# Patient Record
Sex: FEMALE | Race: WHITE | NOT HISPANIC OR LATINO
[De-identification: names, ages, dates, MRNs, and addresses within clinical notes are randomized per-mention and may not be internally consistent; named-entity substitution may affect disease eponyms.]

---

## 2019-09-05 PROBLEM — Z00.00 ENCOUNTER FOR PREVENTIVE HEALTH EXAMINATION: Status: ACTIVE | Noted: 2019-09-05

## 2019-09-10 ENCOUNTER — APPOINTMENT (OUTPATIENT)
Dept: OTOLARYNGOLOGY | Facility: CLINIC | Age: 69
End: 2019-09-10
Payer: MEDICARE

## 2019-09-10 VITALS
HEIGHT: 64 IN | SYSTOLIC BLOOD PRESSURE: 158 MMHG | DIASTOLIC BLOOD PRESSURE: 77 MMHG | BODY MASS INDEX: 25.95 KG/M2 | OXYGEN SATURATION: 97 % | HEART RATE: 63 BPM | TEMPERATURE: 98 F | WEIGHT: 152 LBS

## 2019-09-10 DIAGNOSIS — Z78.9 OTHER SPECIFIED HEALTH STATUS: ICD-10-CM

## 2019-09-10 DIAGNOSIS — H91.90 UNSPECIFIED HEARING LOSS, UNSPECIFIED EAR: ICD-10-CM

## 2019-09-10 PROCEDURE — 99203 OFFICE O/P NEW LOW 30 MIN: CPT

## 2019-09-10 PROCEDURE — 92557 COMPREHENSIVE HEARING TEST: CPT

## 2019-09-10 PROCEDURE — 92550 TYMPANOMETRY & REFLEX THRESH: CPT

## 2019-09-10 NOTE — HISTORY OF PRESENT ILLNESS
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  she feels her hl is moderate and cannot hear her friends talk. referred by dr mayers.

## 2019-09-16 ENCOUNTER — APPOINTMENT (OUTPATIENT)
Dept: OTOLARYNGOLOGY | Facility: CLINIC | Age: 69
End: 2019-09-16
Payer: MEDICARE

## 2019-09-16 ENCOUNTER — APPOINTMENT (OUTPATIENT)
Dept: PHARMACY | Facility: CLINIC | Age: 69
End: 2019-09-16
Payer: SELF-PAY

## 2019-09-16 ENCOUNTER — APPOINTMENT (OUTPATIENT)
Dept: PHARMACY | Facility: CLINIC | Age: 69
End: 2019-09-16
Payer: MEDICARE

## 2019-09-16 VITALS
TEMPERATURE: 98 F | OXYGEN SATURATION: 98 % | DIASTOLIC BLOOD PRESSURE: 80 MMHG | HEART RATE: 78 BPM | SYSTOLIC BLOOD PRESSURE: 184 MMHG

## 2019-09-16 DIAGNOSIS — H90.41 SENSORINEURAL HEARING LOSS, UNILATERAL, RIGHT EAR, WITH UNRESTRICTED HEARING ON THE CONTRALATERAL SIDE: ICD-10-CM

## 2019-09-16 PROCEDURE — 99213 OFFICE O/P EST LOW 20 MIN: CPT

## 2019-09-16 PROCEDURE — V5299A: CUSTOM | Mod: NC

## 2019-09-16 RX ORDER — FLUTICASONE PROPIONATE 50 UG/1
50 SPRAY, METERED NASAL DAILY
Qty: 1 | Refills: 3 | Status: ACTIVE | COMMUNITY
Start: 2019-09-16 | End: 1900-01-01

## 2019-09-16 NOTE — DATA REVIEWED
[de-identified] : pt had to wait and come back due to malfunction at r ultimately reviewed images with pt dr smith and her daughter - mild wm change, reassured, and max retention cyst, else normal

## 2019-09-16 NOTE — HISTORY OF PRESENT ILLNESS
[de-identified] : followup 67 yo woman here with her daughter, with asymm snhl - she had mri and is here to review and discuss her options. She feels her hearing has not changed since last week. No h/o noise exp. no fh nonsmoker.

## 2019-10-28 ENCOUNTER — APPOINTMENT (OUTPATIENT)
Dept: PHARMACY | Facility: CLINIC | Age: 69
End: 2019-10-28
Payer: SELF-PAY

## 2019-10-28 PROCEDURE — V5299A: CUSTOM | Mod: NC

## 2019-11-12 ENCOUNTER — APPOINTMENT (OUTPATIENT)
Dept: PHARMACY | Facility: CLINIC | Age: 69
End: 2019-11-12
Payer: SELF-PAY

## 2019-11-12 PROCEDURE — V5299A: CUSTOM | Mod: NC

## 2019-11-14 ENCOUNTER — APPOINTMENT (OUTPATIENT)
Dept: PHARMACY | Facility: CLINIC | Age: 69
End: 2019-11-14
Payer: SELF-PAY

## 2019-11-14 PROCEDURE — V5257E: CUSTOM

## 2019-11-14 PROCEDURE — V5010 ASSESSMENT FOR HEARING AID: CPT

## 2019-12-23 ENCOUNTER — APPOINTMENT (OUTPATIENT)
Dept: PHARMACY | Facility: CLINIC | Age: 69
End: 2019-12-23
Payer: MEDICARE

## 2019-12-23 PROCEDURE — V5299A: CUSTOM | Mod: NC

## 2021-09-01 ENCOUNTER — APPOINTMENT (OUTPATIENT)
Dept: PHARMACY | Facility: CLINIC | Age: 71
End: 2021-09-01
Payer: SELF-PAY

## 2021-09-01 PROCEDURE — V5299A: CUSTOM | Mod: NC

## 2022-03-29 ENCOUNTER — APPOINTMENT (OUTPATIENT)
Dept: PHARMACY | Facility: CLINIC | Age: 72
End: 2022-03-29
Payer: SELF-PAY

## 2022-03-29 PROCEDURE — V5299A: CUSTOM | Mod: NC

## 2022-04-25 ENCOUNTER — APPOINTMENT (OUTPATIENT)
Dept: PHARMACY | Facility: CLINIC | Age: 72
End: 2022-04-25
Payer: SELF-PAY

## 2022-04-25 PROCEDURE — V5299A: CUSTOM | Mod: NC

## 2022-05-11 ENCOUNTER — APPOINTMENT (OUTPATIENT)
Dept: PHARMACY | Facility: CLINIC | Age: 72
End: 2022-05-11
Payer: SELF-PAY

## 2022-05-11 PROCEDURE — V5299A: CUSTOM | Mod: NC

## 2022-07-06 ENCOUNTER — APPOINTMENT (OUTPATIENT)
Dept: PHARMACY | Facility: CLINIC | Age: 72
End: 2022-07-06

## 2022-07-06 PROCEDURE — V5299A: CUSTOM | Mod: NC

## 2022-07-26 ENCOUNTER — APPOINTMENT (OUTPATIENT)
Dept: PHARMACY | Facility: CLINIC | Age: 72
End: 2022-07-26

## 2022-07-26 PROCEDURE — V5299A: CUSTOM | Mod: NC

## 2022-09-20 ENCOUNTER — APPOINTMENT (OUTPATIENT)
Dept: MAMMOGRAPHY | Facility: CLINIC | Age: 72
End: 2022-09-20

## 2022-09-20 PROCEDURE — 77063 BREAST TOMOSYNTHESIS BI: CPT

## 2022-09-20 PROCEDURE — 77067 SCR MAMMO BI INCL CAD: CPT

## 2022-11-16 ENCOUNTER — APPOINTMENT (OUTPATIENT)
Dept: PHARMACY | Facility: CLINIC | Age: 72
End: 2022-11-16

## 2022-11-16 PROCEDURE — V5299A: CUSTOM | Mod: NC

## 2022-12-12 ENCOUNTER — APPOINTMENT (OUTPATIENT)
Dept: PHARMACY | Facility: CLINIC | Age: 72
End: 2022-12-12

## 2022-12-12 ENCOUNTER — APPOINTMENT (OUTPATIENT)
Dept: RADIOLOGY | Facility: CLINIC | Age: 72
End: 2022-12-12

## 2022-12-12 PROCEDURE — V5299A: CUSTOM | Mod: NC

## 2022-12-12 PROCEDURE — 77080 DXA BONE DENSITY AXIAL: CPT

## 2023-03-29 ENCOUNTER — APPOINTMENT (OUTPATIENT)
Dept: PHARMACY | Facility: CLINIC | Age: 73
End: 2023-03-29
Payer: SELF-PAY

## 2023-03-29 PROCEDURE — V5299A: CUSTOM | Mod: NC

## 2023-08-01 ENCOUNTER — APPOINTMENT (OUTPATIENT)
Dept: OTOLARYNGOLOGY | Facility: CLINIC | Age: 73
End: 2023-08-01
Payer: MEDICARE

## 2023-08-01 ENCOUNTER — APPOINTMENT (OUTPATIENT)
Dept: PHARMACY | Facility: CLINIC | Age: 73
End: 2023-08-01
Payer: SELF-PAY

## 2023-08-01 VITALS
BODY MASS INDEX: 25.95 KG/M2 | OXYGEN SATURATION: 95 % | HEIGHT: 64 IN | TEMPERATURE: 98 F | HEART RATE: 75 BPM | WEIGHT: 152 LBS | SYSTOLIC BLOOD PRESSURE: 178 MMHG | DIASTOLIC BLOOD PRESSURE: 80 MMHG

## 2023-08-01 DIAGNOSIS — H61.21 IMPACTED CERUMEN, RIGHT EAR: ICD-10-CM

## 2023-08-01 PROCEDURE — V5299A: CUSTOM | Mod: NC

## 2023-08-01 PROCEDURE — 92557 COMPREHENSIVE HEARING TEST: CPT

## 2023-08-01 PROCEDURE — 92550 TYMPANOMETRY & REFLEX THRESH: CPT | Mod: 52

## 2023-08-01 PROCEDURE — G0268 REMOVAL OF IMPACTED WAX MD: CPT

## 2023-08-01 PROCEDURE — 99203 OFFICE O/P NEW LOW 30 MIN: CPT | Mod: 25

## 2023-08-01 NOTE — PHYSICAL EXAM
[Normal] : no nystagmus [de-identified] : l nl, r copious cerumen removed atraumatically with suction and alligator forceps, b TMs nl  [de-identified] : gait steady

## 2023-08-01 NOTE — PROCEDURE
[Cerumen Impaction] : Cerumen Impaction [Same] : same as the Pre Op Dx. [] : Removal of Cerumen [FreeTextEntry5] : l nl, r copious cerumen removed atraumatically with suction and alligator forceps, b TMs nl

## 2023-08-01 NOTE — HISTORY OF PRESENT ILLNESS
[de-identified] : 71 y/o F last seen in 2019 with h/o r asymmetric snhl is here for  with audiology. She wears b ha and has noticced that he hearing has decreased. She denies inciting event. She wears HA in r ear only. No fh or sh relevant to cc.She feels her hl is severe.

## 2023-08-01 NOTE — ASSESSMENT
[FreeTextEntry1] : r cerumen impaction  -cerumen removed -ear felt better -leave ha out x 1 day  -avoid qtips  b snhl - gets 84 % discrim with bonaural amplification - urged to get a l ha also  RTC 1 yr and as needed

## 2023-08-22 ENCOUNTER — APPOINTMENT (OUTPATIENT)
Dept: PHARMACY | Facility: CLINIC | Age: 73
End: 2023-08-22
Payer: SELF-PAY

## 2023-08-22 PROCEDURE — V5014F: CUSTOM

## 2023-09-06 ENCOUNTER — APPOINTMENT (OUTPATIENT)
Dept: PHARMACY | Facility: CLINIC | Age: 73
End: 2023-09-06
Payer: SELF-PAY

## 2023-09-06 PROCEDURE — V5299A: CUSTOM

## 2023-11-28 ENCOUNTER — APPOINTMENT (OUTPATIENT)
Dept: MAMMOGRAPHY | Facility: CLINIC | Age: 73
End: 2023-11-28
Payer: MEDICARE

## 2023-11-28 PROCEDURE — 77067 SCR MAMMO BI INCL CAD: CPT

## 2023-11-28 PROCEDURE — 77063 BREAST TOMOSYNTHESIS BI: CPT

## 2024-12-17 ENCOUNTER — APPOINTMENT (OUTPATIENT)
Dept: MAMMOGRAPHY | Facility: CLINIC | Age: 74
End: 2024-12-17
Payer: MEDICARE

## 2024-12-17 ENCOUNTER — APPOINTMENT (OUTPATIENT)
Dept: RADIOLOGY | Facility: CLINIC | Age: 74
End: 2024-12-17

## 2024-12-17 PROCEDURE — 77063 BREAST TOMOSYNTHESIS BI: CPT

## 2024-12-17 PROCEDURE — 77067 SCR MAMMO BI INCL CAD: CPT
